# Patient Record
Sex: FEMALE | URBAN - METROPOLITAN AREA
[De-identification: names, ages, dates, MRNs, and addresses within clinical notes are randomized per-mention and may not be internally consistent; named-entity substitution may affect disease eponyms.]

---

## 2017-02-15 ENCOUNTER — TELEPHONE (OUTPATIENT)
Dept: OTHER | Facility: CLINIC | Age: 57
End: 2017-02-15

## 2017-03-08 NOTE — TELEPHONE ENCOUNTER
3/8/2017    Call Regarding Onboarding [employer]    Attempt 2    Message on voicemail     Comments:       Outreach   cnt

## 2017-04-06 NOTE — TELEPHONE ENCOUNTER
4/6/2017    Call Regarding Onboarding [employer]    Attempt 3    Message on voicemail     Comments:       Outreach   cnt

## 2017-07-15 ENCOUNTER — HEALTH MAINTENANCE LETTER (OUTPATIENT)
Age: 57
End: 2017-07-15

## 2018-05-23 ENCOUNTER — TELEPHONE (OUTPATIENT)
Dept: OTHER | Facility: CLINIC | Age: 58
End: 2018-05-23

## 2021-05-26 ENCOUNTER — RECORDS - HEALTHEAST (OUTPATIENT)
Dept: ADMINISTRATIVE | Facility: CLINIC | Age: 61
End: 2021-05-26